# Patient Record
Sex: FEMALE | Race: BLACK OR AFRICAN AMERICAN | URBAN - METROPOLITAN AREA
[De-identification: names, ages, dates, MRNs, and addresses within clinical notes are randomized per-mention and may not be internally consistent; named-entity substitution may affect disease eponyms.]

---

## 2019-05-02 ENCOUNTER — HOSPITAL ENCOUNTER (EMERGENCY)
Age: 39
Discharge: LEFT BEFORE DISCHARGE | End: 2019-05-02

## 2019-05-02 VITALS
OXYGEN SATURATION: 100 % | DIASTOLIC BLOOD PRESSURE: 81 MMHG | HEIGHT: 66 IN | RESPIRATION RATE: 14 BRPM | TEMPERATURE: 97 F | WEIGHT: 157.85 LBS | SYSTOLIC BLOOD PRESSURE: 124 MMHG | BODY MASS INDEX: 25.37 KG/M2 | HEART RATE: 89 BPM

## 2019-05-02 PROCEDURE — 4500000002 HC ER NO CHARGE

## 2019-05-02 ASSESSMENT — PAIN SCALES - GENERAL: PAINLEVEL_OUTOF10: 0

## 2019-05-03 NOTE — ED NOTES
Per registration, the patient left and was going to go to urgent care tomorrow.      Aldo Vega RN  05/02/19 161 Lulú Grant RN  05/02/19 [id number]

## 2025-02-18 ENCOUNTER — OFFICE VISIT (OUTPATIENT)
Age: 45
End: 2025-02-18

## 2025-02-18 VITALS
WEIGHT: 155.7 LBS | HEART RATE: 63 BPM | DIASTOLIC BLOOD PRESSURE: 74 MMHG | HEIGHT: 66 IN | BODY MASS INDEX: 25.02 KG/M2 | TEMPERATURE: 98.2 F | SYSTOLIC BLOOD PRESSURE: 127 MMHG | OXYGEN SATURATION: 99 %

## 2025-02-18 DIAGNOSIS — M94.0 COSTOCHONDRITIS: Primary | ICD-10-CM

## 2025-02-18 RX ORDER — KETOROLAC TROMETHAMINE 30 MG/ML
30 INJECTION, SOLUTION INTRAMUSCULAR; INTRAVENOUS ONCE
Status: COMPLETED | OUTPATIENT
Start: 2025-02-18 | End: 2025-02-18

## 2025-02-18 RX ORDER — IBUPROFEN 600 MG/1
600 TABLET, FILM COATED ORAL EVERY 8 HOURS PRN
Qty: 30 TABLET | Refills: 0 | Status: SHIPPED | OUTPATIENT
Start: 2025-02-18 | End: 2025-02-28

## 2025-02-18 RX ORDER — FERROUS SULFATE 325(65) MG
325 TABLET ORAL
COMMUNITY

## 2025-02-18 RX ADMIN — KETOROLAC TROMETHAMINE 30 MG: 30 INJECTION, SOLUTION INTRAMUSCULAR; INTRAVENOUS at 09:53

## 2025-02-18 ASSESSMENT — ENCOUNTER SYMPTOMS
CHEST TIGHTNESS: 1
COUGH: 0
WHEEZING: 0
SHORTNESS OF BREATH: 0

## 2025-02-18 NOTE — PROGRESS NOTES
Edda Bullard (:  1980) is a 44 y.o. female,New patient, here for evaluation of the following chief complaint(s):  Headache (Headache and chest wall tenderness x 10 days )      ASSESSMENT/PLAN:    ICD-10-CM    1. Costochondritis  M94.0 ketorolac (TORADOL) injection 30 mg     ibuprofen (ADVIL;MOTRIN) 600 MG tablet        Clinical exam consistent with costochondritis  Differential fibromyalgia, arthritis  Rest  Ice/heat  Gentle stretches  Ketorolac in clinic  May take ibuprofen and acetaminophen together for pain.   Take ibuprofen and acetaminophen every 8 hours as needed for pain.   Do not take more than 3000 mg of acetaminophen in 24 hours  If you take ibuprofen and two extra strength tylenol every 8 hours as needed for pain in 24 hours that will be the maximum of acetaminophen you can take in one day.      Patient verbalized understanding of printed and verbal discharge instructions including follow up care.   Follow up with your primary care provider for persistent symptoms.   Follow up in 7 days if symptoms persist or if symptoms worsen.    SUBJECTIVE/OBJECTIVE:  No headache currently. Had a headache last night.      History provided by:  Patient          Vitals:    25 0938   BP: 127/74   Site: Right Upper Arm   Position: Sitting   Cuff Size: Large Adult   Pulse: 63   Temp: 98.2 °F (36.8 °C)   TempSrc: Oral   SpO2: 99%   Weight: 70.6 kg (155 lb 11.2 oz)   Height: 1.676 m (5' 6\")       Review of Systems   Respiratory:  Positive for chest tightness. Negative for cough, shortness of breath and wheezing.        Physical Exam  Vitals and nursing note reviewed.   Constitutional:       Appearance: Normal appearance.   Eyes:      Conjunctiva/sclera: Conjunctivae normal.   Cardiovascular:      Rate and Rhythm: Normal rate and regular rhythm.   Pulmonary:      Effort: Pulmonary effort is normal.      Breath sounds: Normal breath sounds.   Musculoskeletal:        Arms:       Comments: Tenderness   Skin:

## 2025-02-18 NOTE — PATIENT INSTRUCTIONS
May take ibuprofen and acetaminophen together for pain.   Take ibuprofen and acetaminophen every 8 hours as needed for pain.   Do not take more than 3000 mg of acetaminophen in 24 hours  If you take ibuprofen and two extra strength tylenol every 8 hours as needed for pain in 24 hours that will be the maximum of acetaminophen you can take in one day.